# Patient Record
Sex: FEMALE | Race: WHITE | NOT HISPANIC OR LATINO | Employment: PART TIME | URBAN - METROPOLITAN AREA
[De-identification: names, ages, dates, MRNs, and addresses within clinical notes are randomized per-mention and may not be internally consistent; named-entity substitution may affect disease eponyms.]

---

## 2022-03-08 ENCOUNTER — ANNUAL EXAM (OUTPATIENT)
Dept: OBGYN CLINIC | Facility: CLINIC | Age: 57
End: 2022-03-08
Payer: COMMERCIAL

## 2022-03-08 VITALS
DIASTOLIC BLOOD PRESSURE: 68 MMHG | SYSTOLIC BLOOD PRESSURE: 110 MMHG | WEIGHT: 126.4 LBS | BODY MASS INDEX: 21.06 KG/M2 | HEIGHT: 65 IN

## 2022-03-08 DIAGNOSIS — Z01.419 ROUTINE GYNECOLOGICAL EXAMINATION: Primary | ICD-10-CM

## 2022-03-08 DIAGNOSIS — Z12.31 ENCOUNTER FOR SCREENING MAMMOGRAM FOR MALIGNANT NEOPLASM OF BREAST: ICD-10-CM

## 2022-03-08 PROCEDURE — S0612 ANNUAL GYNECOLOGICAL EXAMINA: HCPCS | Performed by: OBSTETRICS & GYNECOLOGY

## 2022-03-08 NOTE — PROGRESS NOTES
[address], [address], [facility], [address] [facility]    ASSESSMENT/PLAN: Sloane Crews is a 60 y o  No obstetric history on file  who presents for annual gynecologic exam     Encounter for routine gynecologic examination  - Routine well woman exam completed today  - Cervical Cancer Screening: Current ASCCP Guidelines reviewed  Last Pap: 07/05/2016   Next Pap Due: hysterectomy  - HPV Vaccination status: Not immunized  - Contraceptive counseling discussed  Current contraception: none  - Breast Cancer Screening: Last Mammogram Not on file, ordered  - Colorectal cancer screening was not ordered  - The following were reviewed in today's visit: breast self exam, mammography screening ordered and adequate intake of calcium and vitamin D    Additional problems addressed during this visit:  1  Routine gynecological examination    2  Screening for malignant neoplasm of the cervix        CC: Annual Gynecologic Examination    HPI: Sloane Crews is a 60 y o  No obstetric history on file  who presents for annual gynecologic examination  HPI    The following portions of the patient's history were reviewed and updated as appropriate: She  has a past medical history of History of screening mammography (02/27/2020), Irregular heart beat, Skin cancer, and Uterine prolapse  She  has a past surgical history that includes Hysterectomy (2016); Hernia repair; Tonsillectomy; and Skin cancer excision  Her family history is not on file  She was adopted  She  reports that she has been smoking  She has never used smokeless tobacco  She reports current alcohol use  She reports that she does not use drugs  No current outpatient medications on file  No current facility-administered medications for this visit  She has No Known Allergies       Review of Systems      Objective:  /68   Ht 5' 4 5" (1 638 m)   Wt 57 3 kg (126 lb 6 4 oz)   Breastfeeding No   BMI 21 36 kg/m²    Physical Exam PE:  General Appearance: alert and oriented, in no acute distress  HEENT: PERRL, thyroid without masses or tenderness  Breast: No masses, tenderness, skin changes, nipple D/C or axillary or supraclavicular adenopathy  Abdomen: Soft, non-tender, non-distended, no masses, no rebound or guarding  Pelvic:       External genitalia: Normal appearance, no abnormal pigmentation, no lesions or masses  Normal Bartholin's and Brinckerhoff's  Urinary system: Urethral meatus normal, bladder non-tender  Vaginal: normal mucosa without prolapse or lesions  Normal-appearing physiologic discharge      Cervix: Absent      Adnexa: No adnexal masses or tenderness noted  Uterus: Absent  Extremities: Normal range of motion     Skin: normal, no rash or abnormalities  Neurologic: alert, oriented x3  Psychiatric: Appropriate affect, mood stable, cooperative with exam

## 2022-06-24 DIAGNOSIS — Z12.31 ENCOUNTER FOR SCREENING MAMMOGRAM FOR MALIGNANT NEOPLASM OF BREAST: ICD-10-CM

## 2023-02-08 ENCOUNTER — TELEPHONE (OUTPATIENT)
Dept: OBGYN CLINIC | Facility: CLINIC | Age: 58
End: 2023-02-08

## 2023-02-08 DIAGNOSIS — R92.8 ABNORMAL MAMMOGRAM OF LEFT BREAST: ICD-10-CM

## 2023-02-08 NOTE — TELEPHONE ENCOUNTER
Received message from Fabien Lawson # for [facility] facility [phone]  Diagnostic mammo order faxed successfully

## 2023-02-08 NOTE — TELEPHONE ENCOUNTER
Elemr Walton called requesting orders for f/u mammogram being done at [facility]   She WCB with direct Fax#  Per chart review-Dr Karina Dominguez indicated needs 6 month f/u left breast u/s to f/u on abnormal mamm/us   Order generated, holding in nurse bin for correct fax#

## 2023-05-22 LAB — HBA1C MFR BLD HPLC: 5.8 %

## 2023-08-04 ENCOUNTER — TELEPHONE (OUTPATIENT)
Dept: OBGYN CLINIC | Facility: CLINIC | Age: 58
End: 2023-08-04

## 2023-08-04 DIAGNOSIS — Z12.31 ENCOUNTER FOR SCREENING MAMMOGRAM FOR MALIGNANT NEOPLASM OF BREAST: Primary | ICD-10-CM

## 2023-08-04 NOTE — TELEPHONE ENCOUNTER
Patient left message requesting screening mammogram order to [facility]. Attempt to call patient, l/m on pt's vm to please schedule a routine annual WA as patient is due for one this year, once scheduled will forward request to Dr. Selvin Mcnamara to review.

## 2023-08-04 NOTE — TELEPHONE ENCOUNTER
Patient called stating she scheduled for a WA. Requesting screening mammogram order to [facility] and fax # is [phone]. Fax confirmation rec'd.

## 2023-09-18 ENCOUNTER — OFFICE VISIT (OUTPATIENT)
Dept: ENDOCRINOLOGY | Facility: HOSPITAL | Age: 58
End: 2023-09-18
Payer: COMMERCIAL

## 2023-09-18 VITALS
DIASTOLIC BLOOD PRESSURE: 60 MMHG | HEIGHT: 65 IN | BODY MASS INDEX: 20.83 KG/M2 | HEART RATE: 58 BPM | WEIGHT: 125 LBS | OXYGEN SATURATION: 98 % | SYSTOLIC BLOOD PRESSURE: 110 MMHG

## 2023-09-18 DIAGNOSIS — E21.3 HYPERPARATHYROIDISM (HCC): ICD-10-CM

## 2023-09-18 DIAGNOSIS — E83.52 HYPERCALCEMIA: Primary | ICD-10-CM

## 2023-09-18 PROCEDURE — 99244 OFF/OP CNSLTJ NEW/EST MOD 40: CPT | Performed by: STUDENT IN AN ORGANIZED HEALTH CARE EDUCATION/TRAINING PROGRAM

## 2023-09-18 RX ORDER — MELATONIN
2000 DAILY
COMMUNITY

## 2023-09-18 NOTE — PROGRESS NOTES
Chief Complaint   Patient presents with   • Hyperparathyroidism      Referring Provider  Luisa Baker, Do  [address] [facility],  [address]     History of Present Illness:   Josephine Willard is a 59 y.o. female with hypercalcemia seen in consultation at the request of  Dr Daphney Freed    Onset:- 05/2023  Bones- reports was Hx with osteopenia recently, we do not have copy of records  History of fragility fractures- none, had wrist fracture 40 years ago sking   History of Renal Stones- None  History of Abdominal Groans - No constipation, GERD, abdominal pain  Psych moans - No mood changes  No History of increased thirst, increased urination, nocturia or fatigue  Dietary Calcium intake (Quantify milk, cheese, yogurt, etc)- does not intake much  Calcium/Vitamin D supplementation (including MVI)- took high dose vitD 50,000Iu few months ago and now on 2000IU daily. Fhx: high calcium, renal stones- Adopted    Calcium level on routine screening 05/23 10.5, , GFR 85, vitD 8.3   Calcium level repeat 07/23 11.5, , vitD 21.6. GFR 75. Patient Active Problem List   Diagnosis   • Hypercalcemia      Past Medical History:   Diagnosis Date   • History of screening mammography 02/27/2020    Bi-Rads 2.   • Irregular heart beat    • Skin cancer    • Uterine prolapse       Past Surgical History:   Procedure Laterality Date   • HERNIA REPAIR     • HYSTERECTOMY  2016    LAV KIM   • SKIN CANCER EXCISION      Face and neck   • TONSILLECTOMY        Family History   Adopted: Yes     Social History     Tobacco Use   • Smoking status: Every Day     Packs/day: 0.50     Years: 25.00     Total pack years: 12.50     Types: Cigarettes   • Smokeless tobacco: Never   Substance Use Topics   • Alcohol use:  Yes     Alcohol/week: 2.0 standard drinks of alcohol     Types: 2 Glasses of wine per week     Comment: social     No Known Allergies      Current Outpatient Medications:   •  cholecalciferol (VITAMIN D3) 1,000 units tablet, Take 2,000 Units by mouth daily, Disp: , Rfl:   Review of Systems   Constitutional: Negative for fatigue and unexpected weight change. HENT: Negative for trouble swallowing and voice change. Eyes: Negative for photophobia and visual disturbance. Respiratory: Negative for choking and shortness of breath. Gastrointestinal: Negative for constipation and diarrhea. Endocrine: Negative for cold intolerance and heat intolerance. Musculoskeletal: Negative for arthralgias and myalgias. Skin: Negative for rash. Physical Exam:  Body mass index is 21.12 kg/m². /60   Pulse 58   Ht 5' 4.5" (1.638 m)   Wt 56.7 kg (125 lb)   SpO2 98%   BMI 21.12 kg/m²    Wt Readings from Last 3 Encounters:   09/18/23 56.7 kg (125 lb)   03/08/22 57.3 kg (126 lb 6.4 oz)       GEN: NAD  E/n/m nl facies, hearing intact bilat, tongue midline, lips nl  Eyes: no stare or proptosis, nl lids and conjunctiva, EOMI  Neck: trachea midline, heterogenous thyroid gland, no nodules or neck masses noted, no cervical LAD  CV; heart reg rate s1s2 nl, no m/r/g appreciated, no CHAVO  Resp: CTAB, good effort  Ab+BS  Neuro: no tremor, 2+ DTRs in BUE  MS: no c/c in digits, moves all 4 ext, nl muscle bulk, gait nl  Skin: warm and dry, no palmar erythema  Psych: nl mood and affect, no gross lapses in memory    DATA:  Labs:   See above    Radiology    Not on file     Impression:  1. Hypercalcemia    2. Hyperparathyroidism ([address] [facility])           Plan:    Derek Savage was seen today for hyperparathyroidism. Diagnoses and all orders for this visit:    Hypercalcemia  -     NM parathyroid scan w spect; Future  -     Comprehensive metabolic panel; Future  -     PTH, intact; Future  -     Vitamin D 25 hydroxy; Future  -     Comprehensive metabolic panel  -     PTH, intact  -     Vitamin D 25 hydroxy    Hyperparathyroidism ([address])  -     NM parathyroid scan w spect; Future  -     Comprehensive metabolic panel; Future  -     PTH, intact;  Future  -     Vitamin D 25 hydroxy; Future  -     Comprehensive metabolic panel  -     PTH, intact  -     Vitamin D 25 hydroxy      1. Hypercalcemia    PLAN   1. Hypercalcemia, hyperparathyroidism- patient found to have hypercalcemia 10.5 on routine labs with PTH then checked at 108 with repeat labs 2 months later at 11.5 with . This confirms primary hyperparathyroidism. Discussed given calcium >1x ULN meets surgery criteria. Also discussed medical monitoring and Tx with sensipar but would need to be on this forever which she doesn't want. Therefore will obtain sestimibe scan now and if abnormal refer to ENT for surgery. Discussed needs to increase fluid intake to help with worsening hypercalcemia and avoid any dehydrating substance such as caffeine, soda etc or HCTZ. Currently PO fluid intake low. Not on any calcium supplement    2. Vitamin D deficiency: ok to take 2000iu daily for now, will check PTH, CMP and VitD now since last labs 07/23    Discussed with the patient and all questioned fully answered. She will call me if any problems arise.     RTC in 3 months     Jaquelin Hanna MD

## 2023-09-26 LAB
25(OH)D3+25(OH)D2 SERPL-MCNC: 20.6 NG/ML (ref 30–100)
ALBUMIN SERPL-MCNC: 4.6 G/DL (ref 3.8–4.9)
ALBUMIN/GLOB SERPL: 2.1 {RATIO} (ref 1.2–2.2)
ALP SERPL-CCNC: 89 IU/L (ref 44–121)
ALT SERPL-CCNC: 13 IU/L (ref 0–32)
AST SERPL-CCNC: 16 IU/L (ref 0–40)
BILIRUB SERPL-MCNC: 0.5 MG/DL (ref 0–1.2)
BUN SERPL-MCNC: 20 MG/DL (ref 8–27)
BUN/CREAT SERPL: 24 (ref 12–28)
CALCIUM SERPL-MCNC: 11 MG/DL (ref 8.7–10.3)
CHLORIDE SERPL-SCNC: 105 MMOL/L (ref 96–106)
CO2 SERPL-SCNC: 23 MMOL/L (ref 20–29)
CREAT SERPL-MCNC: 0.82 MG/DL (ref 0.57–1)
EGFR: 82 ML/MIN/1.73
GLOBULIN SER-MCNC: 2.2 G/DL (ref 1.5–4.5)
GLUCOSE SERPL-MCNC: 95 MG/DL (ref 70–99)
PHOSPHATE SERPL-MCNC: 2.7 MG/DL (ref 3–4.3)
POTASSIUM SERPL-SCNC: 5 MMOL/L (ref 3.5–5.2)
PROT SERPL-MCNC: 6.8 G/DL (ref 6–8.5)
PTH-INTACT SERPL-MCNC: 92 PG/ML (ref 15–65)
SODIUM SERPL-SCNC: 140 MMOL/L (ref 134–144)

## 2023-11-02 ENCOUNTER — DOCUMENTATION (OUTPATIENT)
Dept: ENDOCRINOLOGY | Facility: HOSPITAL | Age: 58
End: 2023-11-02

## 2023-11-02 NOTE — PROGRESS NOTES
Received signed authorization from patient to request her records from [facility]. Faxed request to the [place] office.

## 2023-11-15 ENCOUNTER — HOSPITAL ENCOUNTER (OUTPATIENT)
Dept: NUCLEAR MEDICINE | Facility: HOSPITAL | Age: 58
Discharge: HOME | End: 2023-11-15
Attending: STUDENT IN AN ORGANIZED HEALTH CARE EDUCATION/TRAINING PROGRAM
Payer: COMMERCIAL

## 2023-11-15 DIAGNOSIS — E83.52 HYPERCALCEMIA: Primary | ICD-10-CM

## 2023-11-15 DIAGNOSIS — E21.3 HYPERPARATHYROIDISM (HCC): ICD-10-CM

## 2023-11-15 DIAGNOSIS — E83.52 HYPERCALCEMIA: ICD-10-CM

## 2023-11-15 PROCEDURE — 78071 PARATHYRD PLANAR W/WO SUBTRJ: CPT

## 2023-11-15 PROCEDURE — G1004 CDSM NDSC: HCPCS

## 2023-11-15 PROCEDURE — A9500 TC99M SESTAMIBI: HCPCS

## 2023-11-21 LAB
25(OH)D3+25(OH)D2 SERPL-MCNC: 23.1 NG/ML (ref 30–100)
ALBUMIN SERPL-MCNC: 4.8 G/DL (ref 3.8–4.9)
ALBUMIN/GLOB SERPL: 2.1 {RATIO} (ref 1.2–2.2)
ALP SERPL-CCNC: 90 IU/L (ref 44–121)
ALT SERPL-CCNC: 13 IU/L (ref 0–32)
AST SERPL-CCNC: 14 IU/L (ref 0–40)
BILIRUB SERPL-MCNC: 0.4 MG/DL (ref 0–1.2)
BUN SERPL-MCNC: 18 MG/DL (ref 8–27)
BUN/CREAT SERPL: 22 (ref 12–28)
CALCIUM SERPL-MCNC: 11.2 MG/DL (ref 8.7–10.3)
CHLORIDE SERPL-SCNC: 105 MMOL/L (ref 96–106)
CO2 SERPL-SCNC: 23 MMOL/L (ref 20–29)
CREAT SERPL-MCNC: 0.83 MG/DL (ref 0.57–1)
EGFR: 81 ML/MIN/1.73
GLOBULIN SER-MCNC: 2.3 G/DL (ref 1.5–4.5)
GLUCOSE SERPL-MCNC: 101 MG/DL (ref 70–99)
POTASSIUM SERPL-SCNC: 5.1 MMOL/L (ref 3.5–5.2)
PROT SERPL-MCNC: 7.1 G/DL (ref 6–8.5)
PTH-INTACT SERPL-MCNC: 94 PG/ML (ref 15–65)
SODIUM SERPL-SCNC: 141 MMOL/L (ref 134–144)

## 2023-12-06 ENCOUNTER — HOSPITAL ENCOUNTER (OUTPATIENT)
Dept: CT IMAGING | Facility: HOSPITAL | Age: 58
Discharge: HOME | End: 2023-12-06
Attending: STUDENT IN AN ORGANIZED HEALTH CARE EDUCATION/TRAINING PROGRAM
Payer: COMMERCIAL

## 2023-12-06 DIAGNOSIS — E83.52 HYPERCALCEMIA: ICD-10-CM

## 2023-12-06 DIAGNOSIS — E21.3 HYPERPARATHYROIDISM (HCC): ICD-10-CM

## 2023-12-06 PROCEDURE — 70492 CT SFT TSUE NCK W/O & W/DYE: CPT

## 2023-12-06 PROCEDURE — G1004 CDSM NDSC: HCPCS

## 2023-12-06 RX ADMIN — IOHEXOL 85 ML: 350 INJECTION, SOLUTION INTRAVENOUS at 18:01

## 2023-12-12 DIAGNOSIS — E04.1 THYROID NODULE: ICD-10-CM

## 2023-12-12 DIAGNOSIS — E21.3 HYPERPARATHYROIDISM (HCC): Primary | ICD-10-CM

## 2024-01-03 ENCOUNTER — ANNUAL EXAM (OUTPATIENT)
Dept: OBGYN CLINIC | Facility: CLINIC | Age: 59
End: 2024-01-03
Payer: COMMERCIAL

## 2024-01-03 VITALS
HEIGHT: 65 IN | BODY MASS INDEX: 21.16 KG/M2 | WEIGHT: 127 LBS | SYSTOLIC BLOOD PRESSURE: 114 MMHG | DIASTOLIC BLOOD PRESSURE: 64 MMHG

## 2024-01-03 DIAGNOSIS — Z01.419 ROUTINE GYNECOLOGICAL EXAMINATION: Primary | ICD-10-CM

## 2024-01-03 DIAGNOSIS — Z12.31 ENCOUNTER FOR SCREENING MAMMOGRAM FOR MALIGNANT NEOPLASM OF BREAST: ICD-10-CM

## 2024-01-03 PROCEDURE — 99396 PREV VISIT EST AGE 40-64: CPT | Performed by: OBSTETRICS & GYNECOLOGY

## 2024-01-03 NOTE — PROGRESS NOTES
[facility] - [address], [address], [place] [address]    ASSESSMENT/PLAN: Maria Lombardi is a 58 y.o.  who presents for annual gynecologic exam.    Encounter for routine gynecologic examination  - Routine well woman exam completed today.  - Cervical Cancer Screening: Current ASCCP Guidelines reviewed. Last Pap: 2016 . Next Pap Due: n/a - hysterectomy  - HPV Vaccination status: Not immunized  - Breast Cancer Screening: Last Mammogram 2022, ordered  - Colorectal cancer screening was not ordered. Up to date  - The following were reviewed in today's visit: breast self exam, mammography screening ordered, and menopause    Additional problems addressed during this visit:  1. Routine gynecological examination    2. Encounter for screening mammogram for malignant neoplasm of breast  -     Mammo screening bilateral w 3d & cad; Future        CC:  Annual Gynecologic Examination    HPI: Maria Lombardi is a 58 y.o.  who presents for annual gynecologic examination.  HPI    The following portions of the patient's history were reviewed and updated as appropriate: She  has a past medical history of History of screening mammography (2020), Irregular heart beat, Skin cancer, Thyroid disease, and Uterine prolapse.  She  has a past surgical history that includes Hysterectomy (2016); Hernia repair; Tonsillectomy; and Skin cancer excision.  Her family history is not on file. She was adopted.  She  reports that she has been smoking cigarettes. She has a 12.5 pack-year smoking history. She has never been exposed to tobacco smoke. She has never used smokeless tobacco. She reports current alcohol use of about 2.0 standard drinks of alcohol per week. She reports that she does not use drugs.  Current Outpatient Medications   Medication Sig Dispense Refill    cholecalciferol (VITAMIN D3) 1,000 units tablet Take 2,000 Units by mouth daily       No current facility-administered medications for this  "visit.     She has No Known Allergies..    Review of Systems      Objective:  /64 (BP Location: Left arm, Patient Position: Sitting, Cuff Size: Standard)   Ht 5' 4.5\" (1.638 m)   Wt 57.6 kg (127 lb)   BMI 21.46 kg/m²    Physical Exam      PE:  General Appearance: alert and oriented, in no acute distress.   HEENT: PERRL, thyroid without masses or tenderness  Breast: No masses, tenderness, skin changes, nipple D/C or axillary or supraclavicular adenopathy  Abdomen: Soft, non-tender, non-distended, no masses, no rebound or guarding.  Pelvic:       External genitalia: Normal appearance, no abnormal pigmentation, no lesions or masses. Normal Bartholin's and Goodridge's.      Urinary system: Urethral meatus normal, bladder non-tender.      Vaginal: normal mucosa without prolapse or lesions. Normal-appearing physiologic discharge      Cervix: Normal-appearing, well-epithelialized, no gross lesions or masses No cervical motion tenderness.      Adnexa: No adnexal masses or tenderness noted.      Uterus: Normal-sized, regular contour, midline, mobile, no uterine tenderness.  Extremities: Normal range of motion.   Skin: normal, no rash or abnormalities  Neurologic: alert, oriented x3  Psychiatric: Appropriate affect, mood stable, cooperative with exam.  "

## 2024-01-15 ENCOUNTER — OFFICE VISIT (OUTPATIENT)
Dept: ENDOCRINOLOGY | Facility: HOSPITAL | Age: 59
End: 2024-01-15
Payer: COMMERCIAL

## 2024-01-15 VITALS
OXYGEN SATURATION: 99 % | BODY MASS INDEX: 21.26 KG/M2 | HEIGHT: 65 IN | DIASTOLIC BLOOD PRESSURE: 76 MMHG | SYSTOLIC BLOOD PRESSURE: 118 MMHG | HEART RATE: 76 BPM | WEIGHT: 127.6 LBS

## 2024-01-15 DIAGNOSIS — E55.9 VITAMIN D DEFICIENCY: ICD-10-CM

## 2024-01-15 DIAGNOSIS — E04.1 THYROID NODULE: ICD-10-CM

## 2024-01-15 DIAGNOSIS — E21.0 PRIMARY HYPERPARATHYROIDISM (HCC): Primary | ICD-10-CM

## 2024-01-15 PROCEDURE — 99214 OFFICE O/P EST MOD 30 MIN: CPT | Performed by: STUDENT IN AN ORGANIZED HEALTH CARE EDUCATION/TRAINING PROGRAM

## 2024-01-15 NOTE — PROGRESS NOTES
Chief Complaint   Patient presents with    Hyperparathyroidism      History of Present Illness:   Maria Lombardi is a 58 y.o. female with primary hyperparathyroidism with hypercalcemia seen for follow up    Onset:- 05/2023  Bones- reports was Hx with osteopenia recently, we do not have copy of records  History of fragility fractures- none, had wrist fracture 40 years ago sking   History of Renal Stones- None  History of Abdominal Groans - No constipation, GERD, abdominal pain  Psych moans - No mood changes  No History of increased thirst, increased urination, nocturia or fatigue  Dietary Calcium intake (Quantify milk, cheese, yogurt, etc)- does not intake much  Calcium/Vitamin D supplementation (including MVI)- took high dose vitD 50,000Iu few months ago and now on 2000IU daily.   Fhx: high calcium, renal stones- Adopted  Calcium level on routine screening 05/23 10.5, , GFR 85, vitD 8.3   Calcium level repeat 07/23 11.5, , vitD 21.6. GFR 75.     Since last visit, we repeat her labs which showed calcium 11.2, PTH 95 and hence recommended a sestimibe scan which was non conclusive with a possible right upper adenoma and hence to improve accuracy CT parathyroid done which showed a 1.3cm right mid-upper parathyroid adenoma. Also revealed a 2.5cm thyroid nodule for which we ordered US thyroid to look at this further. Patient was also referred to ENT for surgical eval since calcium >1x ULN. But she hasn't followed up yet.   Denies any dysphagia, odynophagia. Falls/fractures, polyuria, polydipsia, kidney stones    Patient Active Problem List   Diagnosis    Hypercalcemia      Past Medical History:   Diagnosis Date    History of screening mammography 02/27/2020    Bi-Rads 2.    Irregular heart beat     Skin cancer     Thyroid disease     Uterine prolapse       Past Surgical History:   Procedure Laterality Date    HERNIA REPAIR      HYSTERECTOMY  2016    CANDY BS    SKIN CANCER EXCISION      Face and neck     TONSILLECTOMY        Family History   Adopted: Yes     Social History     Tobacco Use    Smoking status: Every Day     Current packs/day: 0.50     Average packs/day: 0.5 packs/day for 25.0 years (12.5 ttl pk-yrs)     Types: Cigarettes     Passive exposure: Never    Smokeless tobacco: Never   Substance Use Topics    Alcohol use: Yes     Alcohol/week: 2.0 standard drinks of alcohol     Types: 2 Glasses of wine per week     Comment: social     No Known Allergies      Current Outpatient Medications:     cholecalciferol (VITAMIN D3) 1,000 units tablet, Take 2,000 Units by mouth daily, Disp: , Rfl:   Review of Systems   Constitutional:  Negative for fatigue and unexpected weight change.   HENT:  Negative for trouble swallowing and voice change.    Eyes:  Negative for photophobia and visual disturbance.   Respiratory:  Negative for choking and shortness of breath.    Gastrointestinal:  Negative for constipation and diarrhea.   Endocrine: Negative for cold intolerance and heat intolerance.   Musculoskeletal:  Negative for arthralgias and myalgias.   Skin:  Negative for rash.       Physical Exam:  There is no height or weight on file to calculate BMI.  There were no vitals taken for this visit.   Wt Readings from Last 3 Encounters:   01/03/24 57.6 kg (127 lb)   09/18/23 56.7 kg (125 lb)   03/08/22 57.3 kg (126 lb 6.4 oz)       GEN: NAD  E/n/m nl facies, hearing intact bilat, tongue midline, lips nl  Eyes: no stare or proptosis, nl lids and conjunctiva, EOMI  Neck: trachea midline, heterogenous thyroid gland, no nodules or neck masses noted, no cervical LAD  CV; heart reg rate s1s2 nl, no m/r/g appreciated, no CHAVO  Resp: CTAB, good effort  Ab+BS  Neuro: no tremor, 2+ DTRs in BUE  MS: no c/c in digits, moves all 4 ext, nl muscle bulk, gait nl  Skin: warm and dry, no palmar erythema  Psych: nl mood and affect, no gross lapses in memory    DATA:  Labs:    Latest Reference Range & Units 09/25/23 11:11 11/20/23 10:50   Sodium 134  - 144 mmol/L 140 141   Potassium 3.5 - 5.2 mmol/L 5.0 5.1   Chloride 96 - 106 mmol/L 105 105   CO2 20 - 29 mmol/L 23 23   BUN 8 - 27 mg/dL 20 18   Creatinine 0.57 - 1.00 mg/dL 0.82 0.83   SL AMB BUN/CREATININE RATIO 12 - 28  24 22   Glucose, Random 70 - 99 mg/dL 95 101 (H)   AST 0 - 40 IU/L 16 14   ALT 0 - 32 IU/L 13 13   Total Protein 6.0 - 8.5 g/dL 6.8 7.1   Albumin 3.8 - 4.9 g/dL 4.6 4.8   TOTAL BILIRUBIN 0.0 - 1.2 mg/dL 0.5 0.4   eGFR >59 mL/min/1.73 82 81   Phosphorus 3.0 - 4.3 mg/dL 2.7 (L)    Albumin/Globulin Ratio 1.2 - 2.2  2.1 2.1   ALKALINE PHOSPHATASE ISOENZYMES 44 - 121 IU/L 89 90   25-Hydroxy, Vitamin D 30.0 - 100.0 ng/mL 20.6 (L) 23.1 (L)   Globulin, Total 1.5 - 4.5 g/dL 2.2 2.3   CALCIUM 8.7 - 10.3 mg/dL 11.0 (H) 11.2 (H)   (H): Data is abnormally high  (L): Data is abnormally low   Latest Reference Range & Units 09/25/23 11:11 11/20/23 10:50   PTH, Intact 15 - 65 pg/mL 92 (H) 94 (H)   (H): Data is abnormally high      Radiology    Not on file   12/23  Narrative & Impression   CTA  NECK  WITHOUT AND WITH CONTRAST FROM YAMILET TO SKULL BASE     INDICATION: Hyperparathyroidism. Hypercalcemia, hyperparathyroidism. Calcium is 11.2, PTH intact is 94 on laboratory examination dated 11/20/2023.     COMPARISON: Nuclear medicine parathyroid scan with SPECT 11/15/2023..     TECHNIQUE:  Both noncontrast, contrast, and post contrast delayed images were performed according to standard parathyroid protocol (4D technique) Coronal and sagittal reconstructions were performed. 3D reconstructions were performed on an independent   workstation, and are supplied for review.  This examination, like all CT scans performed in the [facility], was performed utilizing techniques to minimize radiation dose exposure, including the use of iterative reconstruction and   automated exposure control.  Rad dose 1059.02 mGy-cm     IV Contrast:  85 mL of iohexol (OMNIPAQUE)     FINDINGS:     SERIAL NONCONTRAST, POST  CONTRAST AND DELAYS: 1.3 x 0.8 cm enhancing nodule posteriorly adjacent to right mid-to-upper thyroid lobe with an adjacent polar vessel (3:221) -Noncon hypodense, poor arterial enhancement, and some venous enhancement -   candidate parathyroid adenoma. This may correspond to uptake on NM parathyroid scan dated 11/15/2023.     VASCULAR STRUCTURES:  Normal enhancement of the cervical vasculature. Mild scattered calcified atherosclerotic disease.     VISUALIZED BRAIN PARENCHYMA:  Normal.     VISUALIZED PARANASAL SINUSES:  Normal.     NASAL CAVITY AND NASOPHARYNX: Normal.     SUPRAHYOID NECK: Dental amalgam with beam hardening artifact slightly limits evaluation of anterior oral cavity; otherwise, normal visualized oral cavity. Normal oropharynx, parapharyngeal and retropharyngeal spaces.     INFRAHYOID NECK:  Larynx and hypopharynx are normal. Glottic and subglottic airway are normal.     THYROID GLAND:   2.5 x 1.8 cm heterogeneously enhancing nodule in the posterior aspect of right mid-to-lower thyroid lobe (4:100). Incidental discovery of one or more thyroid nodule(s) measuring more than 1.5 cm and without suspicious features is noted   in this patient who is above 35 years old; according to guidelines published in the February 2015 white paper on incidental thyroid nodules in the Journal of the American College of Radiology (JACR), further characterization with thyroid ultrasound is   recommended.     Subcentimeter heterogeneously enhancing nodules in right upper thyroid lobe. Subcentimeter hypodense nodules in left thyroid lobe.     LYMPH NODES:  No pathologic or enlarged adenopathy.     MEDIASTINUM: Simple nonenhancing cystic lesion in right paratracheal extending to precarinal mediastinal region.     BONY STRUCTURES: No acute fracture or destructive osseous lesion. Mild degenerative changes of cervical spine.     LUNG APICES: Mild biapical fibrotic change. No focal consolidation in visualized upper lung zone.      NASCET criteria was used to determine the degree of internal carotid artery diameter stenosis.     IMPRESSION:     1.3 cm candidate parathyroid adenoma posteriorly adjacent to right mid-to-upper thyroid lobe. This may correspond to uptake on nuclear medicine parathyroid scan dated 11/15/2023.     2.5 cm heterogeneously enhancing nodule in posterior aspect of right mid-to-lower thyroid lobe. Recommend thyroid ultrasound for further evaluation.     Simple nonenhancing cystic lesion in right paratracheal extending to precarinal mediastinal region. Favor benign etiologies such as foregut duplication cyst, mediastinal lymphocele, or fluid extending from superior pericardial recess into right   paratracheal region.     The study was marked in EPIC for significant notification.       11/23  Result Text   PARATHYROID SCAN     INDICATION:  E83.52: Hypercalcemia  E21.3: Hyperparathyroidism, unspecified     COMPARISON:  None.     TECHNIQUE:   Following the intravenous administration of 26.2 mCi Tc-99m Cardiolite, anterior and bilateral anterior oblique projection images of the neck and mediastinum were obtained at approximately 10 minutes post injection followed at 2 hours post   injection by static anterior and bilateral oblique projections as well as SPECT images in coronal, sagittal and axial projections.     FINDINGS:  Immediate planar images demonstrate heterogeneous activity in the thyroid, with the right lobe appearing larger than the left. Delayed planar images demonstrate washout of the radiotracer bilaterally. On the delayed SPECT images, there is a small   persistent focus of activity overlying the right mid to upper thyroid lobe. This is nonspecific and may represent either a thyroid nodule or possibly parathyroid.     IMPRESSION:     1. Best seen on the delayed SPECT images, there is a small persistent focus of activity overlying the right mid to upper thyroid lobe. This is nonspecific and may represent either a  thyroid nodule or possibly parathyroid. Recommend initial further   evaluation with thyroid ultrasound.       Impression:  1. Primary hyperparathyroidism (HCC)    2. Thyroid nodule    3. Vitamin D deficiency             Plan:    Danni was seen today for hyperparathyroidism.    Diagnoses and all orders for this visit:    Primary hyperparathyroidism (HCC)  -     PTH, intact; Future  -     Vitamin D 25 hydroxy; Future  -     Comprehensive metabolic panel; Future  -     PTH, intact  -     Vitamin D 25 hydroxy  -     Comprehensive metabolic panel    Thyroid nodule  -     PTH, intact; Future  -     Vitamin D 25 hydroxy; Future  -     Comprehensive metabolic panel; Future  -     PTH, intact  -     Vitamin D 25 hydroxy  -     Comprehensive metabolic panel    Vitamin D deficiency  -     PTH, intact; Future  -     Vitamin D 25 hydroxy; Future  -     Comprehensive metabolic panel; Future  -     PTH, intact  -     Vitamin D 25 hydroxy  -     Comprehensive metabolic panel        1. Hypercalcemia    PLAN   Hypercalcemia, hyperparathyroidism- most likely d/t right upper parathyroid adenoma. Discussed surgery is the conclusive tx vs if does not want to persue this can try medication management with sensipar but this is not a conclusive tx. Has last calcium 11/23 was 11.2 which is very close to ULN which meets surgical criteria. She would like to?  We will repeat VitD, Calcium, PTH now for most recent labs     2. Vitamin D deficiency: ok to take 2000iu daily for now, will check PTH, CMP and VitD now    3) thyroid nodule- please obtain US to see features of thyroid nodule further     Discussed with the patient and all questioned fully answered. She will call me if any problems arise.    RTC in 3 months     Janice Orellana MD                  Physical Exam

## 2024-01-19 LAB
25(OH)D3+25(OH)D2 SERPL-MCNC: 36 NG/ML (ref 30–100)
ALBUMIN SERPL-MCNC: 4.9 G/DL (ref 3.8–4.9)
ALBUMIN/GLOB SERPL: 2.2 {RATIO} (ref 1.2–2.2)
ALP SERPL-CCNC: 88 IU/L (ref 44–121)
ALT SERPL-CCNC: 11 IU/L (ref 0–32)
AST SERPL-CCNC: 17 IU/L (ref 0–40)
BILIRUB SERPL-MCNC: 0.3 MG/DL (ref 0–1.2)
BUN SERPL-MCNC: 19 MG/DL (ref 8–27)
BUN/CREAT SERPL: 24 (ref 12–28)
CALCIUM SERPL-MCNC: 10.9 MG/DL (ref 8.7–10.3)
CHLORIDE SERPL-SCNC: 101 MMOL/L (ref 96–106)
CO2 SERPL-SCNC: 26 MMOL/L (ref 20–29)
CREAT SERPL-MCNC: 0.78 MG/DL (ref 0.57–1)
EGFR: 87 ML/MIN/1.73
GLOBULIN SER-MCNC: 2.2 G/DL (ref 1.5–4.5)
GLUCOSE SERPL-MCNC: 88 MG/DL (ref 70–99)
POTASSIUM SERPL-SCNC: 4.4 MMOL/L (ref 3.5–5.2)
PROT SERPL-MCNC: 7.1 G/DL (ref 6–8.5)
PTH-INTACT SERPL-MCNC: 99 PG/ML (ref 15–65)
SODIUM SERPL-SCNC: 139 MMOL/L (ref 134–144)

## 2024-01-22 ENCOUNTER — HOSPITAL ENCOUNTER (OUTPATIENT)
Dept: ULTRASOUND IMAGING | Facility: HOSPITAL | Age: 59
Discharge: HOME | End: 2024-01-22
Attending: STUDENT IN AN ORGANIZED HEALTH CARE EDUCATION/TRAINING PROGRAM
Payer: COMMERCIAL

## 2024-01-22 DIAGNOSIS — E04.1 THYROID NODULE: ICD-10-CM

## 2024-01-22 PROCEDURE — 76536 US EXAM OF HEAD AND NECK: CPT

## 2024-01-23 ENCOUNTER — TELEPHONE (OUTPATIENT)
Dept: ENDOCRINOLOGY | Facility: HOSPITAL | Age: 59
End: 2024-01-23

## 2024-01-23 NOTE — TELEPHONE ENCOUNTER
Ruchi from [place] [facility] called with significant findings on the patient's thyroid ultrasound done yesterday.

## 2024-01-24 ENCOUNTER — TELEPHONE (OUTPATIENT)
Dept: ENDOCRINOLOGY | Facility: HOSPITAL | Age: 59
End: 2024-01-24

## 2024-01-24 DIAGNOSIS — E04.1 THYROID NODULE: Primary | ICD-10-CM

## 2024-02-20 ENCOUNTER — HOSPITAL ENCOUNTER (OUTPATIENT)
Dept: ULTRASOUND IMAGING | Facility: HOSPITAL | Age: 59
Discharge: HOME | End: 2024-02-20
Attending: STUDENT IN AN ORGANIZED HEALTH CARE EDUCATION/TRAINING PROGRAM
Payer: COMMERCIAL

## 2024-02-20 DIAGNOSIS — E04.1 THYROID NODULE: ICD-10-CM

## 2024-02-20 PROCEDURE — 88172 CYTP DX EVAL FNA 1ST EA SITE: CPT | Performed by: PATHOLOGY

## 2024-02-20 PROCEDURE — 88173 CYTOPATH EVAL FNA REPORT: CPT | Performed by: PATHOLOGY

## 2024-02-20 PROCEDURE — 10005 FNA BX W/US GDN 1ST LES: CPT

## 2024-02-20 RX ORDER — LIDOCAINE HYDROCHLORIDE 10 MG/ML
5 INJECTION, SOLUTION EPIDURAL; INFILTRATION; INTRACAUDAL; PERINEURAL ONCE
Status: COMPLETED | OUTPATIENT
Start: 2024-02-20 | End: 2024-02-20

## 2024-02-20 RX ADMIN — LIDOCAINE HYDROCHLORIDE 5 ML: 10 INJECTION, SOLUTION EPIDURAL; INFILTRATION; INTRACAUDAL; PERINEURAL at 13:31

## 2024-02-22 PROCEDURE — 88172 CYTP DX EVAL FNA 1ST EA SITE: CPT | Performed by: PATHOLOGY

## 2024-02-22 PROCEDURE — 88173 CYTOPATH EVAL FNA REPORT: CPT | Performed by: PATHOLOGY

## 2024-02-26 ENCOUNTER — HOSPITAL ENCOUNTER (OUTPATIENT)
Dept: HOSPITAL 99 - WDC | Age: 59
End: 2024-02-26
Payer: COMMERCIAL

## 2024-02-26 DIAGNOSIS — Z12.31: Primary | ICD-10-CM

## 2024-03-13 DIAGNOSIS — Z12.31 ENCOUNTER FOR SCREENING MAMMOGRAM FOR MALIGNANT NEOPLASM OF BREAST: ICD-10-CM

## 2024-07-24 ENCOUNTER — HOSPITAL ENCOUNTER (OUTPATIENT)
Dept: HOSPITAL 99 - SDS | Age: 59
Discharge: HOME | End: 2024-07-24
Payer: COMMERCIAL

## 2024-07-24 VITALS — BODY MASS INDEX: 18.7 KG/M2

## 2024-07-24 VITALS — DIASTOLIC BLOOD PRESSURE: 55 MMHG | RESPIRATION RATE: 12 BRPM | SYSTOLIC BLOOD PRESSURE: 113 MMHG

## 2024-07-24 VITALS
DIASTOLIC BLOOD PRESSURE: 68 MMHG | OXYGEN SATURATION: 2 % | SYSTOLIC BLOOD PRESSURE: 120 MMHG | RESPIRATION RATE: 15 BRPM

## 2024-07-24 VITALS — DIASTOLIC BLOOD PRESSURE: 50 MMHG | SYSTOLIC BLOOD PRESSURE: 99 MMHG

## 2024-07-24 VITALS — DIASTOLIC BLOOD PRESSURE: 68 MMHG | SYSTOLIC BLOOD PRESSURE: 133 MMHG

## 2024-07-24 VITALS — SYSTOLIC BLOOD PRESSURE: 125 MMHG | DIASTOLIC BLOOD PRESSURE: 62 MMHG | RESPIRATION RATE: 16 BRPM

## 2024-07-24 VITALS — OXYGEN SATURATION: 2 %

## 2024-07-24 VITALS — RESPIRATION RATE: 13 BRPM | DIASTOLIC BLOOD PRESSURE: 59 MMHG | SYSTOLIC BLOOD PRESSURE: 120 MMHG

## 2024-07-24 VITALS — RESPIRATION RATE: 13 BRPM | SYSTOLIC BLOOD PRESSURE: 113 MMHG | DIASTOLIC BLOOD PRESSURE: 58 MMHG

## 2024-07-24 VITALS — SYSTOLIC BLOOD PRESSURE: 110 MMHG | DIASTOLIC BLOOD PRESSURE: 58 MMHG | RESPIRATION RATE: 16 BRPM

## 2024-07-24 VITALS — RESPIRATION RATE: 12 BRPM | OXYGEN SATURATION: 2 %

## 2024-07-24 VITALS — DIASTOLIC BLOOD PRESSURE: 54 MMHG

## 2024-07-24 DIAGNOSIS — D34: Primary | ICD-10-CM

## 2024-07-24 DIAGNOSIS — E83.52: ICD-10-CM

## 2024-07-24 DIAGNOSIS — D35.1: ICD-10-CM

## 2024-07-24 LAB
CALCIUM SERPL-MCNC: 11.4 MG/DL (ref 8.4–10.2)
TURBO PTH: 22 PG/ML (ref 13.6–85.8)

## 2024-07-24 PROCEDURE — 60500 EXPLORE PARATHYROID GLANDS: CPT

## 2024-07-24 PROCEDURE — 60220 PARTIAL REMOVAL OF THYROID: CPT

## 2024-07-24 PROCEDURE — 88307 TISSUE EXAM BY PATHOLOGIST: CPT

## 2024-07-24 RX ADMIN — SODIUM CHLORIDE, SODIUM ACETATE ANHYDROUS, SODIUM GLUCONATE, POTASSIUM CHLORIDE, AND MAGNESIUM CHLORIDE 1000: 526; 222; 502; 37; 30 INJECTION, SOLUTION INTRAVENOUS at 10:02

## 2024-07-24 RX ADMIN — SCOLOPAMINE TRANSDERMAL SYSTEM 1 PATCH: 1 PATCH, EXTENDED RELEASE TRANSDERMAL at 10:31

## 2024-07-25 ENCOUNTER — TELEPHONE (OUTPATIENT)
Dept: ENDOCRINOLOGY | Facility: HOSPITAL | Age: 59
End: 2024-07-25